# Patient Record
Sex: MALE | Race: WHITE | NOT HISPANIC OR LATINO | Employment: STUDENT | URBAN - METROPOLITAN AREA
[De-identification: names, ages, dates, MRNs, and addresses within clinical notes are randomized per-mention and may not be internally consistent; named-entity substitution may affect disease eponyms.]

---
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
## 2024-03-22 ENCOUNTER — OFFICE VISIT (OUTPATIENT)
Dept: FAMILY MEDICINE CLINIC | Facility: CLINIC | Age: 19
End: 2024-03-22
Payer: COMMERCIAL

## 2024-03-22 VITALS
TEMPERATURE: 97.7 F | BODY MASS INDEX: 44.1 KG/M2 | HEIGHT: 71 IN | RESPIRATION RATE: 20 BRPM | OXYGEN SATURATION: 95 % | SYSTOLIC BLOOD PRESSURE: 147 MMHG | DIASTOLIC BLOOD PRESSURE: 90 MMHG | HEART RATE: 100 BPM | WEIGHT: 315 LBS

## 2024-03-22 DIAGNOSIS — E11.43 TYPE 2 DIABETES MELLITUS WITH DIABETIC AUTONOMIC NEUROPATHY, WITHOUT LONG-TERM CURRENT USE OF INSULIN: Primary | ICD-10-CM

## 2024-03-22 DIAGNOSIS — E55.9 VITAMIN D DEFICIENCY: ICD-10-CM

## 2024-03-22 PROBLEM — R74.8 ABNORMAL LEVELS OF OTHER SERUM ENZYMES: Status: ACTIVE | Noted: 2024-03-22

## 2024-03-22 LAB
EXPIRATION DATE: NORMAL
HBA1C MFR BLD: 5.5 % (ref 4.5–5.7)
Lab: NORMAL

## 2024-03-22 RX ORDER — PYRIDOXINE HCL (VITAMIN B6) 25 MG
1 LOZENGE ON A HANDLE MUCOUS MEMBRANE
Qty: 12 WAFER | Refills: 0 | Status: SHIPPED | OUTPATIENT
Start: 2024-03-22

## 2024-03-22 NOTE — PROGRESS NOTES
Office Note     Name: Sean Ervin    : 2005     MRN: [id number]     Chief Complaint  Diabetes    Subjective     History of Present Illness:  Sean Ervin is a 18 y.o. male who presents today for diabetes follow up.     History of Present Illness  Sean is here for his DM appointment. He states that he does not monitor blood sugar at home. He states that when his sugar gets low he becomes dizzy  Diabetes  He presents for his follow-up diabetic visit. He has type 2 diabetes mellitus. Hypoglycemia symptoms include dizziness. Pertinent negatives for diabetes include no blurred vision, no chest pain, no foot paresthesias, no foot ulcerations, no polydipsia, no polyphagia, no polyuria and no visual change. Risk factors for coronary artery disease include obesity, male sex and family history. Current diabetic treatment includes insulin injections. His weight is stable. He is following a generally healthy diet. Meal planning includes avoidance of concentrated sweets. He rarely participates in exercise. An ACE inhibitor/angiotensin II receptor blocker is not being taken.  Eye exam is current.     He is not walking or exercising regularly. He agrees to begin exercising.  He is watching his diet intake and mom reports sweet foods are no longer in the house.    A1C improved from 5.8 to 5.5  He was previously seen by Dr. Montiel, Endocrinology.   He agrees to return for annual physical exam and repeat blood work in three months.       No Known Allergies      Current Outpatient Medications:     docusate sodium (Colace) 100 MG capsule, Take 1 capsule by mouth 2 (Two) Times a Day., Disp: 60 capsule, Rfl: 2    Semaglutide, 1 MG/DOSE, (OZEMPIC) 2 MG/1.5ML solution pen-injector, Inject 1 mg under the skin into the appropriate area as directed 1 (One) Time Per Week., Disp: 3 mL, Rfl: 4    Replesta 1.25 MG (17876 UT) wafer, Take 1 each by mouth Every 7 (Seven) Days., Disp: 12 wafer, Rfl: 0    Past Medical History:  "  Diagnosis Date    Acromegaly     ADHD (attention deficit hyperactivity disorder)     Developmental delay     Diabetes mellitus     Fatty liver     Obesity        Review of Systems   Eyes:  Negative for blurred vision.   Cardiovascular:  Negative for chest pain.   Endocrine: Negative for polydipsia, polyphagia and polyuria.   Neurological:  Positive for dizziness.       Social History     Socioeconomic History    Marital status: Single   Tobacco Use    Smoking status: Never    Smokeless tobacco: Never   Vaping Use    Vaping status: Never Used   Substance and Sexual Activity    Alcohol use: Never    Drug use: Never    Sexual activity: Defer       Family History   Problem Relation Age of Onset    No Known Problems Mother     Heart disease Father     Diabetes Father     No Known Problems Maternal Grandmother     Diabetes Maternal Grandfather     No Known Problems Paternal Grandmother     Diabetes Paternal Grandfather            4/21/2023     9:54 AM   PHQ-2/PHQ-9 Depression Screening   Little Interest or Pleasure in Doing Things 0-->not at all   Feeling Down, Depressed or Hopeless 0-->not at all   PHQ-9: Brief Depression Severity Measure Score 0       Fall Risk Screen:  FREDERICK Fall Risk Assessment has not been completed.      Objective     /90 (BP Location: Left arm, Patient Position: Sitting, Cuff Size: Large Adult)   Pulse 100   Temp 97.7 °F (36.5 °C) (Infrared)   Resp 20   Ht 180.3 cm (71\")   Wt (!) 182 kg (401 lb 9.6 oz)   SpO2 95%   BMI 56.01 kg/m²     BP Readings from Last 2 Encounters:   03/22/24 147/90   12/11/23 128/90       Wt Readings from Last 2 Encounters:   03/22/24 (!) 182 kg (401 lb 9.6 oz) (>99%, Z= 3.91)*   12/11/23 (!) 183 kg (403 lb) (>99%, Z= 3.89)*     * Growth percentiles are based on CDC (Boys, 2-20 Years) data.       Pediatric BMI = >99 %ile (Z= 4.20) based on CDC (Boys, 2-20 Years) BMI-for-age based on BMI available as of 3/22/2024..       >99 %ile (Z= 4.20) based on CDC (Boys, " 2-20 Years) BMI-for-age based on BMI available as of 3/22/2024.  Physical Exam  Vitals and nursing note reviewed.   Constitutional:       General: He is not in acute distress.     Appearance: Normal appearance. He is well-groomed. He is morbidly obese. He is not ill-appearing, toxic-appearing or diaphoretic.   HENT:      Head: Normocephalic and atraumatic.   Eyes:      General: Lids are normal. Vision grossly intact. Gaze aligned appropriately.      Extraocular Movements: Extraocular movements intact.      Pupils: Pupils are equal, round, and reactive to light.   Neck:      Vascular: No carotid bruit.   Cardiovascular:      Rate and Rhythm: Normal rate and regular rhythm.      Heart sounds: Normal heart sounds. No murmur heard.  Pulmonary:      Effort: Pulmonary effort is normal.      Breath sounds: Normal breath sounds and air entry.   Musculoskeletal:      Cervical back: Normal range of motion and neck supple.   Skin:     General: Skin is warm and dry.   Neurological:      General: No focal deficit present.      Mental Status: He is alert and oriented to person, place, and time. Mental status is at baseline.   Psychiatric:         Attention and Perception: Attention and perception normal.         Mood and Affect: Mood normal.         Behavior: Behavior normal. Behavior is cooperative.         Thought Content: Thought content normal.       Derm Physical Exam  Result Review     The following data was reviewed by: RUTHY Magadleno on 03/22/2024:  A1C Last 3 Results          4/21/2023    11:54 8/25/2023    15:24   HGBA1C Last 3 Results   Hemoglobin A1C 6.1  5.80         Data reviewed : Consultant notes Endocrinology.      Assessment and Plan     Procedures  Plan   Diagnoses and all orders for this visit:    1. Type 2 diabetes mellitus with diabetic autonomic neuropathy, without long-term current use of insulin (Primary)  Comments:  A1C 5.5  Orders:  -     POC Glycosylated Hemoglobin (Hb A1C)    2. Vitamin D  deficiency  Comments:  Unable to swallow pills.   Replesta chewable prescribed.  Orders:  -     Replesta 1.25 MG (63984 UT) wafer; Take 1 each by mouth Every 7 (Seven) Days.  Dispense: 12 wafer; Refill: 0        Problem List Items Addressed This Visit          Endocrine and Metabolic    Diabetes mellitus - Primary    Relevant Orders    POC Glycosylated Hemoglobin (Hb A1C)    Vitamin D deficiency    Overview     12.2 5/2023.          Relevant Medications    Replesta 1.25 MG (33973 UT) wafer        Follow Up   Wrapup Tab  Return in about 3 months (around 6/22/2024) for Annual physical and fasting labs.   There are no Patient Instructions on file for this visit.   Patient was given instructions and counseling regarding his condition or for health maintenance advice. Please see specific information pulled into the AVS if appropriate.  Hand hygiene was performed during entrance to exam room and following assessment of patient. This document is intended for medical expert use only.     EMR Dragon/Transcription disclaimer:   Much of this encounter note is an electronic transcription/translation of spoken language to printed text. The electronic translation of spoken language may permit erroneous, or at times, nonsensical words or phrases to be inadvertently transcribed.      RUTHY Magdaleno, FNP-C  [facility] [address]  [facility]  [address] [place] [address] [address]  [place] [address] [phone]

## 2024-03-25 ENCOUNTER — TELEPHONE (OUTPATIENT)
Dept: FAMILY MEDICINE CLINIC | Facility: CLINIC | Age: 19
End: 2024-03-25
Payer: COMMERCIAL

## 2024-03-25 NOTE — TELEPHONE ENCOUNTER
Please forward on to patient. They  may be able to purchase from the : [employer] via [employer].     [email or web address]

## 2024-06-27 ENCOUNTER — OFFICE VISIT (OUTPATIENT)
Dept: FAMILY MEDICINE CLINIC | Facility: CLINIC | Age: 19
End: 2024-06-27
Payer: COMMERCIAL

## 2024-06-27 VITALS
SYSTOLIC BLOOD PRESSURE: 130 MMHG | BODY MASS INDEX: 44.1 KG/M2 | OXYGEN SATURATION: 95 % | RESPIRATION RATE: 18 BRPM | HEART RATE: 102 BPM | HEIGHT: 71 IN | WEIGHT: 315 LBS | TEMPERATURE: 97.1 F | DIASTOLIC BLOOD PRESSURE: 82 MMHG

## 2024-06-27 DIAGNOSIS — R80.9 PROTEINURIA, UNSPECIFIED TYPE: ICD-10-CM

## 2024-06-27 DIAGNOSIS — E11.43 TYPE 2 DIABETES MELLITUS WITH DIABETIC AUTONOMIC NEUROPATHY, WITHOUT LONG-TERM CURRENT USE OF INSULIN: ICD-10-CM

## 2024-06-27 DIAGNOSIS — E55.9 VITAMIN D DEFICIENCY: ICD-10-CM

## 2024-06-27 DIAGNOSIS — Z00.00 HEALTH MAINTENANCE EXAMINATION: Primary | ICD-10-CM

## 2024-06-27 DIAGNOSIS — E78.1 HYPERTRIGLYCERIDEMIA: ICD-10-CM

## 2024-06-27 DIAGNOSIS — R45.851 SUICIDAL THOUGHTS: ICD-10-CM

## 2024-06-27 DIAGNOSIS — F41.9 ANXIETY: ICD-10-CM

## 2024-06-27 LAB
BILIRUB BLD-MCNC: NEGATIVE MG/DL
CLARITY, POC: CLEAR
COLOR UR: YELLOW
EXPIRATION DATE: NORMAL
GLUCOSE UR STRIP-MCNC: NEGATIVE MG/DL
HBA1C MFR BLD: 5.7 % (ref 4.5–5.7)
KETONES UR QL: NEGATIVE
LEUKOCYTE EST, POC: NEGATIVE
Lab: NORMAL
NITRITE UR-MCNC: NEGATIVE MG/ML
PH UR: 6 [PH] (ref 5–8)
PROT UR STRIP-MCNC: ABNORMAL MG/DL
RBC # UR STRIP: NEGATIVE /UL
SP GR UR: 1.03 (ref 1–1.03)
UROBILINOGEN UR QL: ABNORMAL

## 2024-06-27 PROCEDURE — 99214 OFFICE O/P EST MOD 30 MIN: CPT

## 2024-06-27 PROCEDURE — 81003 URINALYSIS AUTO W/O SCOPE: CPT

## 2024-06-27 PROCEDURE — 83036 HEMOGLOBIN GLYCOSYLATED A1C: CPT

## 2024-06-27 PROCEDURE — 99395 PREV VISIT EST AGE 18-39: CPT

## 2024-06-27 RX ORDER — BUSPIRONE HYDROCHLORIDE 7.5 MG/1
7.5 TABLET ORAL TAKE AS DIRECTED
Qty: 53 TABLET | Refills: 0 | Status: SHIPPED | OUTPATIENT
Start: 2024-06-27

## 2024-06-27 NOTE — ASSESSMENT & PLAN NOTE
Discussed injury prevention, diet and exercise, safe sexual practices, and screening for common diseases. Encouraged use of sunscreen and seatbelts. Avoidance of tobacco encouraged. Limitation or avoidance of alcohol encouraged.      Anticipatory guidance given and routine screenings recommended with recommendations of yearly dental and eye exams., monitoring of blood pressure, lipids, and screening for colon and lung cancer risks.

## 2024-06-27 NOTE — PROGRESS NOTES
Office Note          Name: Sean Ervin    : 2005     MRN: [id number]     Chief Complaint  Annual Exam (Follow up diabetes)    Subjective     Sean Ervin is a 18 y.o. male here for his annual wellness and physical exam.    Sean is additionally here for coordination of medical care, to discuss health maintenance, disease prevention as well as to follow-up on medical problems.      HPI    History of Present Illness  The patient is a 18-year-old male who is here for physical exam.    The patient presents with mental health issues, characterized by self-bathing behavior and ADHD. He has previously sought psychiatric care at our facility and received counseling from his school. Despite graduating, he expresses a desire for inpatient treatment due to perceived need for self-improvement. He has communicated with his mother and several of his friends, who are actively aware of his emotional state. His relationship with his father is satisfactory, although he admits to infrequent discussions with his father. He acknowledges internal stress, particularly in response to court-related issues, and occasional thoughts of self-harm, with the last occurrence occurring approximately a week ago. He expresses a desire for support, but his life is unpredictable. Apart from his mood, he reports no other health concerns. He is making efforts to incorporate exercise into his routine and maintains adequate hydration. He has previously taken vitamin D supplements. His endocrinologist, Dr. Montiel, has recommended an increase in his Ozempic dosage. His appetite is reduced, and he continues to take semaglutide.    Preventive Care:  Routine dental exams are completed by [facility].   Ophthalmology  - no exams   Anxiety/Depression: Anxiety/adhd     Healthy Habits:  Diet: Generally both healthy and unhealthy.  He does avoid junk food.  Eats chicken nuggets/cheese sticks. Loves vegetables/fruits.      Exercise:  He is working on walking more.   He wears his seatlbelt regularly.   He sleeps a varied amount of sleep per night. He does have sleep apnea.     Caffeine - Coffee occasionally.     Last Physical Exam: [id number]   Last tetanus shot was unknown. .  Prostate Cancer Screening: [unfilled]   Colonoscopy:   Last Completed Colonoscopy       This patient has no relevant Health Maintenance data.             Recent Hospitalizations:  No hospitalization(s) within the last year..       I personally reviewed and updated the patient's allergies, medications, problem list, and past medical, surgical, social, and family history.       Current Outpatient Medications:     docusate sodium (Colace) 100 MG capsule, Take 1 capsule by mouth 2 (Two) Times a Day., Disp: 60 capsule, Rfl: 2    Replesta 1.25 MG (29969 UT) wafer, Take 1 each by mouth Every 7 (Seven) Days., Disp: 12 wafer, Rfl: 0    Semaglutide, 1 MG/DOSE, (OZEMPIC) 2 MG/1.5ML solution pen-injector, Inject 1 mg under the skin into the appropriate area as directed 1 (One) Time Per Week., Disp: 3 mL, Rfl: 4    busPIRone (BUSPAR) 7.5 MG tablet, Take 1 tablet by mouth Take As Directed. 1 daily for 7 days, then BID., Disp: 53 tablet, Rfl: 0      No Known Allergies    Immunization History   Administered Date(s) Administered    COVID-19 (PFIZER) Purple Cap Monovalent 08/25/2021    COVID-19 (UNSPECIFIED) 08/01/2021    DTaP 2004, 2004, 03/05/2005, 01/06/2006    DTaP / Hep B / IPV 2004    DTaP / HiB 01/06/2006    DTaP / IPV 03/16/2010    Flu Vaccine Quad PF 6-35MO 11/30/2021    Hep A, 2 Dose 08/04/2008, 09/10/2009    Hep B / HiB 03/04/2005    Hep B, Adolescent or Pediatric 2004    Hepatitis A 08/04/2008, 09/10/2009    Hepatitis B Adult/Adolescent IM 2004, 2004, 03/05/2005    HiB 2004, 2004, 03/05/2005, 01/06/2006    Hib (HbOC) 2004, 2004    Hpv9 11/30/2021    IPV 2004, 2004, 11/04/2005    Influenza Seasonal Injectable  11/01/2021    Influenza, Unspecified 11/30/2021    MMR 11/04/2005, 03/16/2010    Meningococcal B,(Bexsero) 11/30/2021, 01/26/2022    Meningococcal Conjugate 11/09/2015, 11/30/2021    Meningococcal MCV4P (Menactra) 11/09/2015, 11/30/2021    PEDS-Pneumococcal Conjugate (PCV7) 2004, 2004, 03/05/2005, 01/06/2006    Tdap 11/09/2015    Varicella 11/04/2005, 03/16/2010       Review of Systems   Constitutional:  Negative for chills, fatigue and fever.   Respiratory:  Negative for cough, shortness of breath and wheezing.    Cardiovascular:  Negative for chest pain, palpitations and leg swelling.   Gastrointestinal:  Positive for GERD. Negative for anal bleeding, blood in stool, constipation, diarrhea, nausea and vomiting.   Genitourinary:  Negative for decreased urine volume, difficulty urinating, discharge, dysuria, flank pain, penile pain, penile swelling, scrotal swelling, testicular pain, urgency and urinary incontinence.   Musculoskeletal: Negative.    Psychiatric/Behavioral:  Positive for depressed mood. Negative for self-injury, sleep disturbance, suicidal ideas and stress. The patient is not nervous/anxious.    All other systems reviewed and are negative.       Past Medical History:   Diagnosis Date    Acromegaly     ADHD (attention deficit hyperactivity disorder)     Developmental delay     Diabetes mellitus     Fatty liver     Obesity        Past Surgical History:   Procedure Laterality Date    ADENOIDECTOMY      TONSILLECTOMY         Family History   Problem Relation Age of Onset    No Known Problems Mother     Heart disease Father     Diabetes Father     No Known Problems Maternal Grandmother     Diabetes Maternal Grandfather     No Known Problems Paternal Grandmother     Diabetes Paternal Grandfather        Social History     Socioeconomic History    Marital status: Single   Tobacco Use    Smoking status: Never    Smokeless tobacco: Never   Vaping Use    Vaping status: Never Used   Substance and  "Sexual Activity    Alcohol use: Never    Drug use: Never    Sexual activity: Defer       The ASCVD Risk score (Alison ARELLANO, et al., 2019) failed to calculate.    PHQ-2 Depression Screening      6/27/2024     8:22 AM   PHQ-2/PHQ-9 Depression Screening   Little Interest or Pleasure in Doing Things 0-->not at all   Feeling Down, Depressed or Hopeless 0-->not at all   PHQ-9: Brief Depression Severity Measure Score 0       Fall Risk Screen:  ESTERADI Fall Risk Assessment has not been completed.    Objective     Vital Signs:  /82 (BP Location: Right arm, Patient Position: Sitting, Cuff Size: Large Adult)   Pulse 102   Temp 97.1 °F (36.2 °C) (Temporal)   Resp 18   Ht 180.3 cm (70.98\")   Wt (!) 182 kg (401 lb 12.8 oz)   SpO2 95%   BMI 56.06 kg/m²     BP Readings from Last 2 Encounters:   06/27/24 130/82   03/22/24 147/90       Wt Readings from Last 2 Encounters:   06/27/24 (!) 182 kg (401 lb 12.8 oz) (>99%, Z= 3.93)*   03/22/24 (!) 182 kg (401 lb 9.6 oz) (>99%, Z= 3.91)*     * Growth percentiles are based on CDC (Boys, 2-20 Years) data.       Pediatric BMI = >99 %ile (Z= 4.16) based on CDC (Boys, 2-20 Years) BMI-for-age based on BMI available as of 6/27/2024.. Class 3 Severe Obesity (BMI >=40). Obesity-related health conditions include the following: diabetes mellitus. Obesity is improving with treatment. BMI is is above average; BMI management plan is completed. We discussed portion control and increasing exercise.      >99 %ile (Z= 4.16) based on CDC (Boys, 2-20 Years) BMI-for-age based on BMI available as of 6/27/2024.  Physical Exam:  Physical Exam  Vitals and nursing note reviewed.   Constitutional:       General: He is not in acute distress.     Appearance: Normal appearance. He is well-groomed. He is morbidly obese. He is not ill-appearing, toxic-appearing or diaphoretic.   HENT:      Head: Normocephalic and atraumatic.   Eyes:      General: Lids are normal. Vision grossly intact. Gaze aligned appropriately. "      Extraocular Movements: Extraocular movements intact.      Pupils: Pupils are equal, round, and reactive to light.   Neck:      Vascular: No carotid bruit.   Cardiovascular:      Rate and Rhythm: Normal rate and regular rhythm.      Heart sounds: Normal heart sounds. No murmur heard.  Pulmonary:      Effort: Pulmonary effort is normal.      Breath sounds: Normal breath sounds and air entry.   Musculoskeletal:      Cervical back: Normal range of motion and neck supple.   Skin:     General: Skin is warm and dry.   Neurological:      General: No focal deficit present.      Mental Status: He is alert and oriented to person, place, and time. Mental status is at baseline.   Psychiatric:         Attention and Perception: Attention and perception normal.         Mood and Affect: Mood is depressed.         Behavior: Behavior normal. Behavior is cooperative.         Thought Content: Thought content normal.       Derm Physical Exam  Physical Exam      Results:   Result Review :   Results  Laboratory Studies  A1c is 5.7.        Labs:       Imaging:   No valid procedures specified.        Data reviewed:          Assessment / Plan      Healthcare Maintenance:  Screenings for osteoporosis, prostate cancer, lung cancer, colon cancer discussed.     Last Completed Colonoscopy       This patient has no relevant Health Maintenance data.         declines colon cancer screening  Orders placed today for appropriate screenings.     Discussed injury prevention, diet and exercise, safe sexual practices, and screening for common diseases.   Encouraged use of sunscreen and seatbelts. Avoidance of tobacco encouraged.   Limitation or avoidance of alcohol encouraged.     Anticipatory guidance given and encouraged:  yearly dental and eye exams, monitoring of blood pressure, lipids, and screening for colon and lung cancer risks.            Problems Addressed this Visit          Unprioritized    Diabetes mellitus    Relevant Medications     Semaglutide, 1 MG/DOSE, (OZEMPIC) 2 MG/1.5ML solution pen-injector    Other Relevant Orders    POC Glycosylated Hemoglobin (Hb A1C) (Completed)    Health maintenance examination - Primary     Discussed injury prevention, diet and exercise, safe sexual practices, and screening for common diseases. Encouraged use of sunscreen and seatbelts. Avoidance of tobacco encouraged. Limitation or avoidance of alcohol encouraged.      Anticipatory guidance given and routine screenings recommended with recommendations of yearly dental and eye exams., monitoring of blood pressure, lipids, and screening for colon and lung cancer risks.              Relevant Orders    Lipid Panel With LDL / HDL Ratio    CBC & Differential    Comprehensive Metabolic Panel    POC Urinalysis Dipstick, Multipro (Completed)    Hypertriglyceridemia    Vitamin D deficiency    Relevant Orders    Vitamin D 25 hydroxy     Other Visit Diagnoses       Suicidal thoughts        Previous thoughts but no current plan or idea.    Discussed inpatient facilities and action plan.    Relevant Orders    Ambulatory Referral to Psychiatry    Anxiety        Relevant Medications    busPIRone (BUSPAR) 7.5 MG tablet    Other Relevant Orders    Ambulatory Referral to Psychiatry    Body mass index 50.0-59.9, adult        Proteinuria, unspecified type        Relevant Orders    Microalbumin / Creatinine Urine Ratio - Urine, Clean Catch          Diagnoses         Codes Comments    Health maintenance examination    -  Primary ICD-10-CM: Z00.00  ICD-9-CM: V70.0     Type 2 diabetes mellitus with diabetic autonomic neuropathy, without long-term current use of insulin     ICD-10-CM: E11.43  ICD-9-CM: 250.60, 337.1     Suicidal thoughts     ICD-10-CM: R45.851  ICD-9-CM: V62.84 Previous thoughts but no current plan or idea.    Discussed inpatient facilities and action plan.    Anxiety     ICD-10-CM: F41.9  ICD-9-CM: 300.00     Vitamin D deficiency     ICD-10-CM: E55.9  ICD-9-CM: 268.9      Hypertriglyceridemia     ICD-10-CM: E78.1  ICD-9-CM: 272.1     Body mass index 50.0-59.9, adult     ICD-10-CM: Z68.43  ICD-9-CM: V85.43     Proteinuria, unspecified type     ICD-10-CM: R80.9  ICD-9-CM: 791.0            Diagnoses and all orders for this visit:    1. Health maintenance examination (Primary)  Assessment & Plan:  Discussed injury prevention, diet and exercise, safe sexual practices, and screening for common diseases. Encouraged use of sunscreen and seatbelts. Avoidance of tobacco encouraged. Limitation or avoidance of alcohol encouraged.      Anticipatory guidance given and routine screenings recommended with recommendations of yearly dental and eye exams., monitoring of blood pressure, lipids, and screening for colon and lung cancer risks.         Orders:  -     Lipid Panel With LDL / HDL Ratio  -     CBC & Differential  -     Comprehensive Metabolic Panel  -     POC Urinalysis Dipstick, Multipro    2. Type 2 diabetes mellitus with diabetic autonomic neuropathy, without long-term current use of insulin  -     Semaglutide, 1 MG/DOSE, (OZEMPIC) 2 MG/1.5ML solution pen-injector; Inject 1 mg under the skin into the appropriate area as directed 1 (One) Time Per Week.  Dispense: 3 mL; Refill: 4  -     POC Glycosylated Hemoglobin (Hb A1C)    3. Suicidal thoughts  Comments:  Previous thoughts but no current plan or idea.    Discussed inpatient facilities and action plan.  Orders:  -     Ambulatory Referral to Psychiatry    4. Anxiety  -     Ambulatory Referral to Psychiatry  -     busPIRone (BUSPAR) 7.5 MG tablet; Take 1 tablet by mouth Take As Directed. 1 daily for 7 days, then BID.  Dispense: 53 tablet; Refill: 0    5. Vitamin D deficiency  -     Vitamin D 25 hydroxy    6. Hypertriglyceridemia    7. Body mass index 50.0-59.9, adult    8. Proteinuria, unspecified type  -     Cancel: Microalbumin / Creatinine Urine Ratio - Urine, Clean Catch  -     Microalbumin / Creatinine Urine Ratio - Urine, Clean  Catch      Assessment & Plan  1. Physical examination.  The patient's weight has decreased by 3 pounds since 09/2023. A repeat blood work, inclusive of a blood count, metabolic profile, cholesterol, and vitamin D levels, will be conducted. The patient has been advised to engage in 150 minutes of exercise per week, maintain adequate hydration, wear seatbelts, and apply sunscreen when outdoors.    2. ADHD.  A referral to psychiatry has been made. BuSpar 7.5 mg has been prescribed for 7 days, after which it will be increased to 7.5 mg twice daily.    Follow-up  A follow-up appointment is scheduled for 1 month from now.      Follow Up   Wrapup Tab  Return in about 4 weeks (around 7/25/2024) for Recheck mood.    Recent lab results if available were discussed as well as medications reconciled.    All questions answered patient agrees with plan of care.    He agrees to return for next routine follow up or sooner as needed.     Patient Instructions   Continue current plan of care as discussed.   Take medication as ordered (if applicable).    Practice good sleep hygiene.  Eat a well balanced diet with fresh fruit and vegetables.    Drink at least 8 bottles of water or equivalent per day.     Limit sweetened beverages, sodas, juices.    Bake, boil, broil or grill your food, avoid eating fried foods.   Exercise at least 150 minutes per week.      Patient was given instructions and counseling regarding his condition or for health maintenance advice. Please see specific information pulled into the AVS if appropriate.  Hand hygiene was performed during entrance to exam room and following assessment of patient. This document is intended for medical expert use only.     EMR Dragon/Transcription disclaimer:   Much of this encounter note is an electronic transcription/translation of spoken language to printed text. The electronic translation of spoken language may permit erroneous, or at times, nonsensical words or phrases to be  inadvertently transcribed.      RUTHY Magdaleno, FNP-C  [address]  [facility]  [address] [place] [address] [address]  [place] [address] [phone]    Patient or patient representative verbalized consent for the use of Ambient Listening during the visit with  RUTHY Magdaleno for chart documentation. 6/27/2024  20:57 EDT

## 2024-06-28 LAB
ALBUMIN/CREAT UR: 10 MG/G CREAT (ref 0–29)
CREAT UR-MCNC: 338.6 MG/DL
MICROALBUMIN UR-MCNC: 32.6 UG/ML

## 2024-08-04 DIAGNOSIS — F41.9 ANXIETY: ICD-10-CM

## 2024-08-05 RX ORDER — BUSPIRONE HYDROCHLORIDE 7.5 MG/1
TABLET ORAL
Qty: 53 TABLET | Refills: 0 | Status: SHIPPED | OUTPATIENT
Start: 2024-08-05

## 2025-04-08 ENCOUNTER — OFFICE VISIT (OUTPATIENT)
Dept: FAMILY MEDICINE CLINIC | Facility: CLINIC | Age: 20
End: 2025-04-08
Payer: COMMERCIAL

## 2025-04-08 VITALS
HEART RATE: 103 BPM | RESPIRATION RATE: 18 BRPM | DIASTOLIC BLOOD PRESSURE: 82 MMHG | BODY MASS INDEX: 44.1 KG/M2 | SYSTOLIC BLOOD PRESSURE: 122 MMHG | OXYGEN SATURATION: 97 % | TEMPERATURE: 97 F | WEIGHT: 315 LBS | HEIGHT: 71 IN

## 2025-04-08 DIAGNOSIS — Z11.59 NEED FOR HEPATITIS C SCREENING TEST: ICD-10-CM

## 2025-04-08 DIAGNOSIS — E66.01 MORBIDLY OBESE: ICD-10-CM

## 2025-04-08 DIAGNOSIS — E55.9 VITAMIN D DEFICIENCY: ICD-10-CM

## 2025-04-08 DIAGNOSIS — E11.43 TYPE 2 DIABETES MELLITUS WITH DIABETIC AUTONOMIC NEUROPATHY, WITHOUT LONG-TERM CURRENT USE OF INSULIN: ICD-10-CM

## 2025-04-08 DIAGNOSIS — F41.9 ANXIETY: ICD-10-CM

## 2025-04-08 DIAGNOSIS — Z71.85 IMMUNIZATION COUNSELING: ICD-10-CM

## 2025-04-08 DIAGNOSIS — Z00.00 HEALTH MAINTENANCE EXAMINATION: Primary | ICD-10-CM

## 2025-04-08 LAB
BILIRUB BLD-MCNC: NEGATIVE MG/DL
CLARITY, POC: CLEAR
COLOR UR: YELLOW
EXPIRATION DATE: ABNORMAL
GLUCOSE UR STRIP-MCNC: NEGATIVE MG/DL
HBA1C MFR BLD: 5.9 % (ref 4.5–5.7)
KETONES UR QL: NEGATIVE
LEUKOCYTE EST, POC: NEGATIVE
Lab: ABNORMAL
NITRITE UR-MCNC: NEGATIVE MG/ML
PH UR: 7 [PH] (ref 5–8)
PROT UR STRIP-MCNC: NEGATIVE MG/DL
RBC # UR STRIP: NEGATIVE /UL
SP GR UR: 1.02 (ref 1–1.03)
UROBILINOGEN UR QL: NORMAL

## 2025-04-08 RX ORDER — PYRIDOXINE HCL (VITAMIN B6) 25 MG
1 LOZENGE ON A HANDLE MUCOUS MEMBRANE
Qty: 12 WAFER | Refills: 0 | Status: SHIPPED | OUTPATIENT
Start: 2025-04-08

## 2025-04-08 RX ORDER — LANCETS 33 GAUGE
1 EACH MISCELLANEOUS DAILY
Qty: 100 EACH | Refills: 3 | Status: SHIPPED | OUTPATIENT
Start: 2025-04-08

## 2025-04-08 RX ORDER — GLUCOSAMINE HCL/CHONDROITIN SU 500-400 MG
1 CAPSULE ORAL DAILY
Qty: 100 EACH | Refills: 3 | Status: SHIPPED | OUTPATIENT
Start: 2025-04-08

## 2025-04-08 RX ORDER — BLOOD-GLUCOSE METER
1 EACH MISCELLANEOUS DAILY
Qty: 1 KIT | Refills: 0 | Status: SHIPPED | OUTPATIENT
Start: 2025-04-08

## 2025-04-08 NOTE — PROGRESS NOTES
Office Note     Name: Sean Ervin    : 2005     MRN: [id number]     Chief Complaint  Diabetes and Anxiety    Subjective     History of Present Illness:  Sean Ervin is a 19 y.o. male who presents today for follow up on anxiety was last seen in office 2024..... patient never picked up the Buspar said that he is unable to take pills. Patient needs a BS machine to check BS at home. He said the anxiety has been better since graduating high school.     Diabetes  He presents for his follow-up diabetic visit. He has type 2 diabetes mellitus. Pertinent negatives for hypoglycemia include no nervousness/anxiousness. Pertinent negatives for diabetes include no chest pain, no polydipsia, no polyphagia and no polyuria. Symptoms are improving. Risk factors for coronary artery disease include male sex, sedentary lifestyle, obesity and dyslipidemia. Current diabetic treatments: semaglutide 1mg weekly. He is compliant with treatment all of the time.     History of Present Illness  The patient is a 19-year-old male who presents for a follow-up visit.    He has been managing his diabetes with Ozempic 1 mg weekly, which has resulted in weight loss and decreased appetite. He reports no hypoglycemic episodes or side effects from the medication. He does not monitor his blood glucose levels at home. He is making efforts to incorporate exercise into his routine.    He has discontinued the use of BuSpar due to difficulty swallowing pills but reports no current need for anxiety medication. He reports no suicidal or homicidal ideation. He is not experiencing any sleep disturbances or stress-related issues.    He is not currently sexually active and has not had any changes in sexual partners or practices since his last visit. He expresses no concerns about sexually transmitted infections. He feels safe in his home environment, where he resides with his mother. He reports no respiratory symptoms such as coughing,  wheezing, shortness of breath, chest pain, palpitations, or leg swelling. He also reports no gastrointestinal symptoms such as nausea, vomiting, abdominal pain, diarrhea, or constipation. He is not experiencing any neurological symptoms such as confusion, dizziness, headaches, memory problems, or weakness. He reports no musculoskeletal symptoms such as muscle aches, pains, or arthritis. He reports no breast lumps, bumps, or masses. He is not experiencing polydipsia, polyuria, or hyperphagia. He is currently unemployed and is considering taking a gap year.    ALLERGIES  The patient has no known allergies.    MEDICATIONS  Current: Ozempic, vitamin D.  Discontinued: BuSpar.    IMMUNIZATIONS  He declined the HPV vaccine.    No Known Allergies      Current Outpatient Medications:     Replesta 1.25 MG (30471 UT) wafer, Take 1 each by mouth Every 7 (Seven) Days., Disp: 12 wafer, Rfl: 0    Semaglutide, 1 MG/DOSE, (OZEMPIC) 2 MG/1.5ML solution pen-injector, Inject 1 mg under the skin into the appropriate area as directed 1 (One) Time Per Week., Disp: 3 mL, Rfl: 11    Review of Systems   Respiratory:  Negative for cough, shortness of breath and wheezing.    Cardiovascular:  Negative for chest pain, palpitations and leg swelling.   Gastrointestinal: Negative.    Endocrine: Negative for polydipsia, polyphagia and polyuria.   Genitourinary: Negative.    Musculoskeletal: Negative.    Allergic/Immunologic: Negative for environmental allergies and food allergies.   Neurological: Negative.    Psychiatric/Behavioral:  Negative for dysphoric mood, self-injury, sleep disturbance, suicidal ideas, depressed mood and stress. The patient is not nervous/anxious.        Social History     Socioeconomic History    Marital status: Single   Tobacco Use    Smoking status: Never    Smokeless tobacco: Never   Vaping Use    Vaping status: Never Used   Substance and Sexual Activity    Alcohol use: Never    Drug use: Never    Sexual activity: Defer  "      Family History   Problem Relation Age of Onset    No Known Problems Mother     Heart disease Father     Diabetes Father     No Known Problems Maternal Grandmother     Diabetes Maternal Grandfather     No Known Problems Paternal Grandmother     Diabetes Paternal Grandfather            4/8/2025     8:17 AM   PHQ-2/PHQ-9 Depression Screening   Little interest or pleasure in doing things Not at all   Feeling down, depressed, or hopeless Not at all   How difficult have these problems made it for you to do your work, take care of things at home, or get along with other people? Not difficult at all       Fall Risk Screen:  FREDERICK Fall Risk Assessment has not been completed.      Objective     /82 (BP Location: Right arm, Patient Position: Sitting, Cuff Size: Large Adult)   Pulse 103   Temp 97 °F (36.1 °C) (Temporal)   Resp 18   Ht 180.3 cm (70.98\")   Wt (!) 183 kg (402 lb 6.4 oz)   SpO2 97%   BMI 56.15 kg/m²     BP Readings from Last 2 Encounters:   04/08/25 122/82   06/27/24 130/82       Wt Readings from Last 2 Encounters:   04/08/25 (!) 183 kg (402 lb 6.4 oz)   06/27/24 (!) 182 kg (401 lb 12.8 oz) (>99%, Z= 3.93)*     * Growth percentiles are based on CDC (Boys, 2-20 Years) data.       Class 3 Severe Obesity (BMI >=40). Obesity-related health conditions include the following: diabetes mellitus, dyslipidemias, and hepatic steatosis. Obesity is unchanged. BMI is is above average; BMI management plan is completed. We discussed low calorie, low carb based diet program, portion control, increasing exercise, joining a fitness center or start home based exercise program, management of depression/anxiety/stress to control compensatory eating, and an edd-based approach such as MyFitness Pal or Lose It.      Facility age limit for growth %usama is 20 years.  Physical Exam  Vitals and nursing note reviewed.   Constitutional:       General: He is not in acute distress.     Appearance: Normal appearance. He is " well-groomed. He is morbidly obese. He is not ill-appearing.   HENT:      Head: Normocephalic and atraumatic.   Eyes:      General: Lids are normal. Vision grossly intact.   Neck:      Vascular: No carotid bruit.   Cardiovascular:      Rate and Rhythm: Normal rate and regular rhythm.      Heart sounds: Normal heart sounds.   Pulmonary:      Effort: Pulmonary effort is normal.      Breath sounds: Normal breath sounds and air entry.   Musculoskeletal:      Right lower leg: No edema.      Left lower leg: No edema.   Skin:     General: Skin is warm and dry.   Neurological:      Mental Status: He is alert and oriented to person, place, and time. Mental status is at baseline.   Psychiatric:         Mood and Affect: Mood normal.         Behavior: Behavior normal. Behavior is cooperative.       Derm Physical Exam  Physical Exam      Vital Signs  Blood pressure is 122/82.    Result Review :       Results  Laboratory Studies  A1c is 5.9.    Labs:       Imaging:   No valid procedures specified.        Data reviewed:        Assessment and Plan     Plan      Assessment & Plan  Health maintenance examination  Discussed injury prevention, diet and exercise, safe sexual practices, and screening for common diseases. Encouraged use of sunscreen and seatbelts. Avoidance of tobacco encouraged. Limitation or avoidance of alcohol encouraged.      Anticipatory guidance given and routine screenings recommended with recommendations of yearly dental and eye exams., monitoring of blood pressure, lipids, and screening for colon and lung cancer risks.          Type 2 diabetes mellitus with diabetic autonomic neuropathy, without long-term current use of insulin      Orders:    POCT urinalysis dipstick, multipro    Microalbumin / Creatinine Urine Ratio - Urine, Clean Catch    POC Glycosylated Hemoglobin (Hb A1C)    Semaglutide, 1 MG/DOSE, (OZEMPIC) 2 MG/1.5ML solution pen-injector; Inject 1 mg under the skin into the appropriate area as directed  1 (One) Time Per Week.    Morbidly obese  Class 3 Severe Obesity (BMI >=40). Obesity-related health conditions include the following: diabetes mellitus, dyslipidemias, and hepatic steatosis. Obesity is unchanged. BMI is is above average; BMI management plan is completed. We discussed low calorie, low carb based diet program, portion control, increasing exercise, joining a fitness center or start home based exercise program, management of depression/anxiety/stress to control compensatory eating, and an edd-based approach such as [employer] or Lose It.         Vitamin D deficiency    Orders:    Replesta 1.25 MG (15009 UT) wafer; Take 1 each by mouth Every 7 (Seven) Days.    Need for hepatitis C screening test    Orders:    Hepatitis C Antibody    Immunization counseling  Declined at this time.       Anxiety  Resolved.         Assessment & Plan  1. Diabetes Mellitus.  His A1c level is currently at 5.9, indicating good control of his diabetes. He is on Ozempic 1 mg every week and reports no side effects. He has been advised to continue his current medication regimen. He has been encouraged to engage in regular physical activity, including weightlifting, to help lower blood glucose levels and aid in weight loss.    2. Anxiety.  He reports that his anxiety is currently manageable and does not feel the need for medication.     3. Health Maintenance.  He has been offered the Gardasil vaccine but declined at this time. He will receive the pneumonia vaccine today. His vitamin D supplements have been refilled.       Follow Up   Wrapup Tab  Return for Annual physical when due , Recheck a1c in 6 months. .     Patient was given instructions and counseling regarding his condition or for health maintenance advice. Please see specific information pulled into the AVS if appropriate.  Hand hygiene was performed during entrance to exam room and following assessment of patient. This document is intended for medical expert use only.        RUTHY Magdaleno, FNP-C  [address]  [facility]  [address] [place] [address] [address]  [place] [address] [phone]    Patient or patient representative verbalized consent for the use of Ambient Listening during the visit with  RUTHY Magdaleno for chart documentation. 4/8/2025  11:00 EDT

## 2025-04-08 NOTE — ASSESSMENT & PLAN NOTE
Orders:    POCT urinalysis dipstick, multipro    Microalbumin / Creatinine Urine Ratio - Urine, Clean Catch    POC Glycosylated Hemoglobin (Hb A1C)    Semaglutide, 1 MG/DOSE, (OZEMPIC) 2 MG/1.5ML solution pen-injector; Inject 1 mg under the skin into the appropriate area as directed 1 (One) Time Per Week.

## 2025-04-09 LAB
25(OH)D3+25(OH)D2 SERPL-MCNC: 33.2 NG/ML (ref 30–100)
ALBUMIN SERPL-MCNC: 4.7 G/DL (ref 4.3–5.2)
ALBUMIN/CREAT UR: 22 MG/G CREAT (ref 0–29)
ALP SERPL-CCNC: 64 IU/L (ref 51–125)
ALT SERPL-CCNC: 115 IU/L (ref 0–44)
AST SERPL-CCNC: 50 IU/L (ref 0–40)
BASOPHILS # BLD AUTO: 0 X10E3/UL (ref 0–0.2)
BASOPHILS NFR BLD AUTO: 1 %
BILIRUB SERPL-MCNC: 0.4 MG/DL (ref 0–1.2)
BUN SERPL-MCNC: 10 MG/DL (ref 6–20)
BUN/CREAT SERPL: 11 (ref 9–20)
CALCIUM SERPL-MCNC: 9.6 MG/DL (ref 8.7–10.2)
CHLORIDE SERPL-SCNC: 102 MMOL/L (ref 96–106)
CHOLEST SERPL-MCNC: 132 MG/DL (ref 100–199)
CO2 SERPL-SCNC: 23 MMOL/L (ref 20–29)
CREAT SERPL-MCNC: 0.88 MG/DL (ref 0.76–1.27)
CREAT UR-MCNC: 126.8 MG/DL
EGFRCR SERPLBLD CKD-EPI 2021: 126 ML/MIN/1.73
EOSINOPHIL # BLD AUTO: 0.1 X10E3/UL (ref 0–0.4)
EOSINOPHIL NFR BLD AUTO: 1 %
ERYTHROCYTE [DISTWIDTH] IN BLOOD BY AUTOMATED COUNT: 13.4 % (ref 11.6–15.4)
GLOBULIN SER CALC-MCNC: 2.8 G/DL (ref 1.5–4.5)
GLUCOSE SERPL-MCNC: 111 MG/DL (ref 70–99)
HCT VFR BLD AUTO: 46.7 % (ref 37.5–51)
HCV IGG SERPL QL IA: NON REACTIVE
HDLC SERPL-MCNC: 37 MG/DL
HGB BLD-MCNC: 15.4 G/DL (ref 13–17.7)
IMM GRANULOCYTES # BLD AUTO: 0 X10E3/UL (ref 0–0.1)
IMM GRANULOCYTES NFR BLD AUTO: 0 %
LDLC SERPL CALC-MCNC: 76 MG/DL (ref 0–99)
LDLC/HDLC SERPL: 2.1 RATIO (ref 0–3.6)
LYMPHOCYTES # BLD AUTO: 2.7 X10E3/UL (ref 0.7–3.1)
LYMPHOCYTES NFR BLD AUTO: 34 %
MCH RBC QN AUTO: 29.2 PG (ref 26.6–33)
MCHC RBC AUTO-ENTMCNC: 33 G/DL (ref 31.5–35.7)
MCV RBC AUTO: 88 FL (ref 79–97)
MICROALBUMIN UR-MCNC: 28 UG/ML
MONOCYTES # BLD AUTO: 0.9 X10E3/UL (ref 0.1–0.9)
MONOCYTES NFR BLD AUTO: 11 %
NEUTROPHILS # BLD AUTO: 4.3 X10E3/UL (ref 1.4–7)
NEUTROPHILS NFR BLD AUTO: 53 %
PLATELET # BLD AUTO: 320 X10E3/UL (ref 150–450)
POTASSIUM SERPL-SCNC: 4.7 MMOL/L (ref 3.5–5.2)
PROT SERPL-MCNC: 7.5 G/DL (ref 6–8.5)
RBC # BLD AUTO: 5.28 X10E6/UL (ref 4.14–5.8)
SODIUM SERPL-SCNC: 140 MMOL/L (ref 134–144)
TRIGL SERPL-MCNC: 99 MG/DL (ref 0–149)
VLDLC SERPL CALC-MCNC: 19 MG/DL (ref 5–40)
WBC # BLD AUTO: 8 X10E3/UL (ref 3.4–10.8)

## 2025-04-10 ENCOUNTER — TELEPHONE (OUTPATIENT)
Dept: FAMILY MEDICINE CLINIC | Facility: CLINIC | Age: 20
End: 2025-04-10
Payer: COMMERCIAL

## 2025-06-17 ENCOUNTER — TELEPHONE (OUTPATIENT)
Dept: FAMILY MEDICINE CLINIC | Facility: CLINIC | Age: 20
End: 2025-06-17

## 2025-06-17 DIAGNOSIS — E11.43 TYPE 2 DIABETES MELLITUS WITH DIABETIC AUTONOMIC NEUROPATHY, WITHOUT LONG-TERM CURRENT USE OF INSULIN: ICD-10-CM

## 2025-06-17 RX ORDER — SEMAGLUTIDE 1.34 MG/ML
INJECTION, SOLUTION SUBCUTANEOUS
Qty: 3 ML | OUTPATIENT
Start: 2025-06-17

## 2025-06-18 NOTE — TELEPHONE ENCOUNTER
Caller: CAROLE DESHPANDE    Relationship: Mother    Best call back number: [phone]    What medication are you requesting:     Semaglutide,      What are your current symptoms: WEIGHT    Have you had these symptoms before:    [x] Yes  [] No    Have you been treated for these symptoms before:   [x] Yes  [] No    If a prescription is needed, what is your preferred pharmacy and phone number: [id number] - [place]  [address] [facility] [address] AT [facility] OF  & [address] [facility] - [phone]  - [phone]     Additional notes:PATIENTS MOTHER CALLED (CAROLE) STATED [facility] STATED THE MEDICATION WAS DENIED AND HAS NOT BEEN FILLED.    PATIENT TAKES NEXT DOSE ON SATURDAY AND IS COMPLETELY OUT.    CAROLE WAS WONDERING IF IT HAD BEEN DENIED DUE TO NEEDING AN INCREASE IN DOSE.      CAROLE SUBMITTED A REQUEST THROUGH [facility] AND RECEIVED A RETURN EMAIL STATING IT HAD BEEN DENIED AND WOULD NEED TO TALK WITH PCP.    CAROLE REQUESTED A SECOND REQUEST THIS MORNING AND IS CONCERNED THAT IT WILL BE DENIED.

## 2025-06-18 NOTE — TELEPHONE ENCOUNTER
Mom was notified that [facility] was contacted. She was putting in an old Rx number. They will order it and let patient know when in